# Patient Record
(demographics unavailable — no encounter records)

---

## 2024-10-18 NOTE — DISCUSSION/SUMMARY
[] : The components of the vaccine(s) to be administered today are listed in the plan of care. The disease(s) for which the vaccine(s) are intended to prevent and the risks have been discussed with the caretaker.  The risks are also included in the appropriate vaccination information statements which have been provided to the patient's caregiver.  The caregiver has given consent to vaccinate. [FreeTextEntry1] :   Chief Complaint: Presents to review immunization needs.   Nurse reviewed vaccines with provider/ACIP recommendation.   No current symptoms or PMH contraindicating vaccination.  Expectant care. Follow up as needed for fever trend, new, or concerning symptoms.

## 2025-04-22 NOTE — PHYSICAL EXAM
[Normal Gait and Station] : normal gait and station [Normal muscle strength, symmetry and tone of facial, head and neck musculature] : normal muscle strength, symmetry and tone of facial, head and neck musculature [Normal] : no cervical lymphadenopathy [2+] : 2+ [Exposed Vessel] : left anterior vessel not exposed [Wheezing] : no wheezing [Increased Work of Breathing] : no increased work of breathing with use of accessory muscles and retractions

## 2025-04-22 NOTE — ASSESSMENT
[FreeTextEntry1] : 2 year female with snoring and ATH  This patient presents with chronic snoring and disrupted sleep raising the concern for as yet undiagnosed obstructive sleep apnea (ANG). Given the patient's age and potential impact on growth, development, and behavior, I have ordered a pediatric sleep study to evaluate for ANG. While awaiting the results, I recommend the use of a nasal steroid at night to address potential rhinitis and nasal congestion symptoms contributing to this patient's symptomatology.  Parents were counseled on the appropriate dosing and monitoring of this medication.    Discussed snoring vs UARS vs ANG.  Discussed that primary snoring is not harmful in and off itself but obstructive sleep apnea is different.  Often if we suspect SDB or ANG, we recommend evaluating and treating due to long term risk of quality of life issues, learning issues and, in severe cases, heart and lung problems.  Given current uncertainty if this is true ANG or just primary snoring, would recommend a PSG at this time.  Pending the results of the PSG, if ANG is determined, and depending on severity, we discuss risks, alternatives, and benefits of surgical intervention such as adenotonsillectomy including alternatives of observation or CPAP.  Risks of adenotonsillectomy include, but are not limited to, bleeding, infection, scarring, voice changes, pain, dehydration, persistence of sleep apnea, and regrowth of adenoids.  Given the patient's complicated medical history including age the resulting medical and surgical complexity and significantly increased risk of perioperative respiratory complications the patient would need to be admitted to the hospital following surgery for close observation if plan OR.  Can trial flonase or flonase sensimist depending on age. Can trial for 3 weeks, max effect around 6-8 weeks.    PSG ordered  RTC after PSG  During this encounter I have reviewed prior external notes, reviewed and independently interpreted any labs, imaging, and other testing ordered by myself or other clinicians.  I have discussed with the family any medications and their potential side effects, any testing or imaging that has been ordered and their potential findings, as well as discussed any potential surgical interventions with their respective risks, benefits, and alternatives.

## 2025-04-22 NOTE — HISTORY OF PRESENT ILLNESS
[de-identified] : 4-22-25 2yr F here for initial evaluation of chronic nasal congestion and snoring  Initially seen by another ENT who dismissed symptoms Allergy tested with all negative results   +Snoring +Pausing, choking, and gasping +Daytime fatigue, hyperactivity, restless sleep  Complains she is always tired +Chronic nasal congestion, no nasal steroid use  Cough lingers for several weeks after viral infection Never had PSG   Was treated for reflux with Famotidine but has since stopped History of choking with feeding and drinking  Never had a swallow study  Did not qualify for feeding therapy   No recent ear or throat infections   No hearing or speech concerns History of tongue tie release as infant In [facility] for behavioral concerns  History of PICA   Passed [facility] +Family history of bleeding disorder (mom has von willebrands, dad has MTHFR)  No family history of anesthesia complications Followed by allerg and GI  Parent used as independent historian given patient age and maturity.